# Patient Record
(demographics unavailable — no encounter records)

---

## 2024-12-13 NOTE — HISTORY OF PRESENT ILLNESS
[FreeTextEntry1] : 66yo  postmenopausal(premature menopause) here for evaluation of vaginal staining. Is currently using Yuvafem twice weekly. She had staining last Monday not related to Yuvafem insertion(followed exercise on treadmill) first noted on toilet tissue then stained undergarment. She has not seen any further staining.  She is on Eliquis for Paroxysmal A Fib. Had SIS/EMB 3/5/20(Has thickened EM echo) SIS-> no intracavitary mass, uniformly thin EM rind; EMB->atrophic appearing EM cells   [PapSmeardate] : 4/25/24 [TextBox_31] : Neg/HPV Neg

## 2025-01-30 NOTE — HISTORY OF PRESENT ILLNESS
[FreeTextEntry1] : 65yo  S/P Hysteroscopic polypectomy 25 here for post-op exam. Pathology->benign endometrial polyp with tubal metaplasia. Upper vaginal stenosis made visualization and dilatation of cervix challenging even with general anesthesia.